# Patient Record
Sex: FEMALE | Race: WHITE | NOT HISPANIC OR LATINO | URBAN - METROPOLITAN AREA
[De-identification: names, ages, dates, MRNs, and addresses within clinical notes are randomized per-mention and may not be internally consistent; named-entity substitution may affect disease eponyms.]

---

## 2022-01-26 ENCOUNTER — APPOINTMENT (RX ONLY)
Dept: URBAN - METROPOLITAN AREA CLINIC 41 | Facility: CLINIC | Age: 57
Setting detail: DERMATOLOGY
End: 2022-01-26

## 2022-01-26 DIAGNOSIS — L72.8 OTHER FOLLICULAR CYSTS OF THE SKIN AND SUBCUTANEOUS TISSUE: ICD-10-CM | Status: STABLE

## 2022-01-26 DIAGNOSIS — L20.89 OTHER ATOPIC DERMATITIS: ICD-10-CM | Status: INADEQUATELY CONTROLLED

## 2022-01-26 PROBLEM — L20.84 INTRINSIC (ALLERGIC) ECZEMA: Status: ACTIVE | Noted: 2022-01-26

## 2022-01-26 PROCEDURE — ? ADDITIONAL NOTES

## 2022-01-26 PROCEDURE — ? TREATMENT REGIMEN

## 2022-01-26 PROCEDURE — ? PRESCRIPTION MEDICATION MANAGEMENT

## 2022-01-26 PROCEDURE — 99203 OFFICE O/P NEW LOW 30 MIN: CPT

## 2022-01-26 PROCEDURE — ? COUNSELING

## 2022-01-26 PROCEDURE — ? PRESCRIPTION

## 2022-01-26 RX ORDER — DESONIDE 0.5 MG/G
CREAM TOPICAL
Qty: 60 | Refills: 0 | Status: ERX | COMMUNITY
Start: 2022-01-26

## 2022-01-26 RX ORDER — KETOCONAZOLE 20 MG/ML
SHAMPOO, SUSPENSION TOPICAL
Qty: 120 | Refills: 1 | Status: ERX | COMMUNITY
Start: 2022-01-26

## 2022-01-26 RX ORDER — TRIAMCINOLONE ACETONIDE 1 MG/G
CREAM TOPICAL BID
Qty: 453.6 | Refills: 0 | Status: ERX | COMMUNITY
Start: 2022-01-26

## 2022-01-26 RX ADMIN — TRIAMCINOLONE ACETONIDE: 1 CREAM TOPICAL at 00:00

## 2022-01-26 RX ADMIN — DESONIDE: 0.5 CREAM TOPICAL at 00:00

## 2022-01-26 RX ADMIN — KETOCONAZOLE: 20 SHAMPOO, SUSPENSION TOPICAL at 00:00

## 2022-01-26 ASSESSMENT — LOCATION SIMPLE DESCRIPTION DERM: LOCATION SIMPLE: RIGHT HAND

## 2022-01-26 ASSESSMENT — LOCATION ZONE DERM: LOCATION ZONE: HAND

## 2022-01-26 ASSESSMENT — LOCATION DETAILED DESCRIPTION DERM: LOCATION DETAILED: RIGHT HYPOTHENAR EMINENCE

## 2022-01-26 NOTE — HPI: RASH
Is This A New Presentation, Or A Follow-Up?: Rash
Additional History: Pt notes that rash was on scalp months ago and she changed shampoo and it seemed to get better. Pt is using otc cortisone and goldbond lotion. It has flared up within the past month. Pt notes her medication- lamotrigine, has a side effect warning for rashes but she has been taking for 2 years. \\n\\nNew PT.

## 2022-01-26 NOTE — PROCEDURE: COUNSELING
Patient Specific Counseling (Will Not Stick From Patient To Patient): SP notes this does appear to be a cyst, however doesn’t rule out wart. Patient to rto if fails to clear with tx. Will consider shave bx at f/u if still present
Detail Level: Detailed
Patient Specific Counseling (Will Not Stick From Patient To Patient): Pt does not have any blistering or sores in or near mouth or genitals present, SP does not  think this was caused by medications, presents as eczema today. If worsens, pt develops blisters, sores, fever, chills, pt seek immediate medical attention.
Detail Level: Simple

## 2022-01-26 NOTE — PROCEDURE: PRESCRIPTION MEDICATION MANAGEMENT
Initiate Treatment: Triamcinolone 0.1% cream bid x 2 weeks, large jar. Ketoconazole shampoo, desonide bid 7-10 days
Render In Strict Bullet Format?: No
Detail Level: Zone

## 2022-02-16 ENCOUNTER — APPOINTMENT (RX ONLY)
Dept: URBAN - METROPOLITAN AREA CLINIC 41 | Facility: CLINIC | Age: 57
Setting detail: DERMATOLOGY
End: 2022-02-16

## 2022-02-16 DIAGNOSIS — Z41.9 ENCOUNTER FOR PROCEDURE FOR PURPOSES OTHER THAN REMEDYING HEALTH STATE, UNSPECIFIED: ICD-10-CM

## 2022-02-16 DIAGNOSIS — L81.4 OTHER MELANIN HYPERPIGMENTATION: ICD-10-CM | Status: STABLE

## 2022-02-16 DIAGNOSIS — L20.89 OTHER ATOPIC DERMATITIS: ICD-10-CM | Status: WELL CONTROLLED

## 2022-02-16 DIAGNOSIS — L72.8 OTHER FOLLICULAR CYSTS OF THE SKIN AND SUBCUTANEOUS TISSUE: ICD-10-CM

## 2022-02-16 PROBLEM — D48.5 NEOPLASM OF UNCERTAIN BEHAVIOR OF SKIN: Status: ACTIVE | Noted: 2022-02-16

## 2022-02-16 PROBLEM — L20.84 INTRINSIC (ALLERGIC) ECZEMA: Status: ACTIVE | Noted: 2022-02-16

## 2022-02-16 PROCEDURE — ? PRESCRIPTION MEDICATION MANAGEMENT

## 2022-02-16 PROCEDURE — ? ADDITIONAL NOTES

## 2022-02-16 PROCEDURE — ? COUNSELING

## 2022-02-16 PROCEDURE — ? COSMETIC CONSULTATION: BOTOX

## 2022-02-16 PROCEDURE — 99213 OFFICE O/P EST LOW 20 MIN: CPT

## 2022-02-16 PROCEDURE — ? COSMETIC CONSULTATION: LASER RESURFACING

## 2022-02-16 PROCEDURE — ? PRESCRIPTION

## 2022-02-16 RX ORDER — TRETIONIN 0.25 MG/G
CREAM TOPICAL
Qty: 45 | Refills: 4 | Status: ERX | COMMUNITY
Start: 2022-02-16

## 2022-02-16 RX ADMIN — TRETIONIN: 0.25 CREAM TOPICAL at 00:00

## 2022-02-16 ASSESSMENT — LOCATION ZONE DERM
LOCATION ZONE: FACE
LOCATION ZONE: TRUNK

## 2022-02-16 ASSESSMENT — LOCATION DETAILED DESCRIPTION DERM
LOCATION DETAILED: LEFT CENTRAL MALAR CHEEK
LOCATION DETAILED: RIGHT INFERIOR MEDIAL UPPER BACK

## 2022-02-16 ASSESSMENT — LOCATION SIMPLE DESCRIPTION DERM
LOCATION SIMPLE: LEFT CHEEK
LOCATION SIMPLE: RIGHT UPPER BACK

## 2022-02-16 NOTE — HPI: COSMETIC CONSULTATION
Additional History: Pt here for cosmetic consultation\\nInterested in Botox to forehead, glabella, and evening out brows - pt notes droopy left eye she would like evened out

## 2022-02-16 NOTE — PROCEDURE: PRESCRIPTION MEDICATION MANAGEMENT
Detail Level: Zone
Render In Strict Bullet Format?: No
Plan: Moisturize daily
Discontinue Regimen: Topical steroids
Initiate Treatment: Tretinoin 0.025

## 2022-02-16 NOTE — PROCEDURE: COUNSELING
Detail Level: Simple
Patient Specific Counseling (Will Not Stick From Patient To Patient): SP notes we can do bx to figure out exact dx since this sometimes bothers pt. Pt says she has not been good about using topical. Pt prefers to try topical consistently first. SP says this is fine and recommends rto if not resolving for possible bx in a few weeks
Detail Level: Detailed

## 2022-02-16 NOTE — PROCEDURE: ADDITIONAL NOTES
Render Risk Assessment In Note?: no
Detail Level: Simple
Additional Notes: Pt states she will schedule separate appointment for actual injections, does not want to inject today. EG advises this lasts approx 3-4 months. \\n\\nFrontalis 5-9\\nCrows 24\\nGlabella 20\\nLeft brow lift 2\\n\\nEG quotes above units for Botox, 55 total units for above is 990, without eyes is 31 so 558\\nPt advises she prefers the second option\\n\\nFor neck tightening EG advises on cool peel laser treatment as well as foster polar laser\\nEG advises on neck firming cream sold at the office

## 2022-02-23 ENCOUNTER — RX ONLY (OUTPATIENT)
Age: 57
Setting detail: RX ONLY
End: 2022-02-23

## 2022-02-23 RX ORDER — TRETIONIN 0.25 MG/G
CREAM TOPICAL
Qty: 45 | Refills: 4 | Status: CANCELLED

## 2022-02-28 ENCOUNTER — RX ONLY (OUTPATIENT)
Age: 57
Setting detail: RX ONLY
End: 2022-02-28

## 2022-02-28 RX ORDER — TRETIONIN 0.25 MG/G
CREAM TOPICAL
Qty: 45 | Refills: 4 | Status: ERX